# Patient Record
Sex: MALE | Race: WHITE | NOT HISPANIC OR LATINO | Employment: UNEMPLOYED | URBAN - METROPOLITAN AREA
[De-identification: names, ages, dates, MRNs, and addresses within clinical notes are randomized per-mention and may not be internally consistent; named-entity substitution may affect disease eponyms.]

---

## 2021-04-27 ENCOUNTER — LAB VISIT (OUTPATIENT)
Dept: LAB | Facility: HOSPITAL | Age: 31
End: 2021-04-27
Attending: NURSE PRACTITIONER
Payer: COMMERCIAL

## 2021-04-27 DIAGNOSIS — F20.9 SUBCHRONIC SCHIZOPHRENIA: Primary | ICD-10-CM

## 2021-04-27 PROCEDURE — 93005 ELECTROCARDIOGRAM TRACING: CPT | Mod: PO | Performed by: GENERAL PRACTICE

## 2022-03-03 ENCOUNTER — OCCUPATIONAL HEALTH (OUTPATIENT)
Dept: URGENT CARE | Facility: CLINIC | Age: 32
End: 2022-03-03

## 2022-03-03 PROCEDURE — 80305 DRUG TEST PRSMV DIR OPT OBS: CPT | Mod: S$GLB,,, | Performed by: EMERGENCY MEDICINE

## 2022-03-03 PROCEDURE — 80305 PR COLLECTION ONLY DRUG SCREEN: ICD-10-PCS | Mod: S$GLB,,, | Performed by: EMERGENCY MEDICINE

## 2022-08-01 ENCOUNTER — HOSPITAL ENCOUNTER (OUTPATIENT)
Dept: RADIOLOGY | Facility: HOSPITAL | Age: 32
Discharge: HOME | End: 2022-08-01
Attending: NURSE PRACTITIONER
Payer: COMMERCIAL

## 2022-08-01 DIAGNOSIS — M54.50 LUMBAGO: ICD-10-CM

## 2022-08-01 DIAGNOSIS — M54.50 LUMBAGO: Primary | ICD-10-CM

## 2022-08-01 PROCEDURE — 72110 X-RAY EXAM L-2 SPINE 4/>VWS: CPT | Mod: TC,PO

## 2023-03-28 ENCOUNTER — HOSPITAL ENCOUNTER (OUTPATIENT)
Dept: RADIOLOGY | Facility: HOSPITAL | Age: 33
Discharge: HOME | End: 2023-03-28
Payer: COMMERCIAL

## 2023-03-28 DIAGNOSIS — R74.8 ELEVATED LIVER ENZYMES: ICD-10-CM

## 2023-04-12 ENCOUNTER — HOSPITAL ENCOUNTER (OUTPATIENT)
Dept: RADIOLOGY | Facility: HOSPITAL | Age: 33
Discharge: HOME | End: 2023-04-12
Payer: COMMERCIAL

## 2023-04-12 PROCEDURE — 76705 ECHO EXAM OF ABDOMEN: CPT | Mod: TC,PO

## 2023-10-24 ENCOUNTER — NURSE TRIAGE (OUTPATIENT)
Dept: ADMINISTRATIVE | Facility: CLINIC | Age: 33
End: 2023-10-24

## 2023-10-24 NOTE — TELEPHONE ENCOUNTER
Have a red line across the back of his leg and has a slight indentation. From the bottom of his foot near the heel of his foot and he just noticed the area.  Reason for Disposition   Looks like a boil, infected sore, deep ulcer or other infected rash    Additional Information   Negative: Acne suspected   Negative: Athlete's Foot suspected   Negative: Chickenpox suspected (e.g., fever, widespread rash after known chickenpox exposure)   Negative: Hives suspected   Negative: Impetigo suspected   Negative: Insect bites suspected   Negative: Jock Itch suspected (i.e., itchy rash on inner thighs near genital area)   Negative: Poison ivy, oak, or sumac rash suspected (e.g., itchy rash after contact with poison ivy)   Negative: Rash of penis or scrotum   Negative: Ringworm suspected (i.e., round pink patch, sometimes looks like ring, usually 1/2 to 1 inch [12-25 mm],  in size, slowly increasing in size)   Negative: Sunburn suspected   Negative: Swimmer's Itch suspected   Negative: Wound infection suspected   Negative: Small spot, skin growth, or mole   Negative: [1] Widespread rash AND [2] began after starting a medicine OR within 3 days of stopping it   Negative: [1] Widespread rash AND [2] cause unknown   [1] Localized rash AND [2] cause unknown   Negative: [1] Sudden onset of rash (within last 2 hours) AND [2] difficulty breathing or swallowing   Negative: Sounds like a life-threatening emergency to the triager   Negative: [1] Localized purple or blood-colored spots or dots AND [2] not from injury or friction AND [3] fever   Negative: Patient sounds very sick or weak to the triager   Negative: [1] Red area or streak AND [2] fever   Negative: [1] Rash is painful to touch AND [2] fever   Negative: [1] Looks infected (spreading redness, pus) AND [2] large red area (> 2 in. or 5 cm)   Negative: [1] Looks infected (spreading redness, pus) AND [2] diabetes mellitus or weak immune system (e.g., HIV  positive, cancer chemo, splenectomy, organ transplant, chronic steroids)   Negative: [1] Localized purple or blood-colored spots or dots AND [2] not from injury or friction AND [3] no fever   Negative: [1] Looks infected (spreading redness, pus) AND [2] no fever    Protocols used: Rash - Guideline [id number]-H-DA, Rash or Redness - Auosbobzs-W-OZ

## 2024-03-19 DIAGNOSIS — E11.9 TYPE 2 DIABETES MELLITUS WITHOUT COMPLICATIONS: Primary | ICD-10-CM

## 2024-08-12 ENCOUNTER — HOSPITAL ENCOUNTER (EMERGENCY)
Facility: HOSPITAL | Age: 34
Discharge: LEFT BEFORE DISCHARGE | End: 2024-08-12
Payer: COMMERCIAL

## 2024-08-12 VITALS
RESPIRATION RATE: 18 BRPM | DIASTOLIC BLOOD PRESSURE: 60 MMHG | WEIGHT: 238 LBS | HEART RATE: 106 BPM | TEMPERATURE: 97 F | BODY MASS INDEX: 33.32 KG/M2 | OXYGEN SATURATION: 96 % | HEIGHT: 71 IN | SYSTOLIC BLOOD PRESSURE: 133 MMHG

## 2024-08-12 PROCEDURE — 99281 EMR DPT VST MAYX REQ PHY/QHP: CPT

## 2024-08-12 NOTE — FIRST PROVIDER EVALUATION
Emergency Department TeleTriage Encounter Note      CHIEF COMPLAINT    Chief Complaint   Patient presents with    Arm Problem     Possible  in grown hair        VITAL SIGNS   Initial Vitals [08/12/24 1136]   BP Pulse Resp Temp SpO2   133/60 106 18 97.3 °F (36.3 °C) 96 %      MAP       --            ALLERGIES    Review of patient's allergies indicates:  No Known Allergies    PROVIDER TRIAGE NOTE  Pulled an ingrown hair out and now has red dots to arm. Denies pain, swelling, or redness to the arm. No fever.     Patient states he will likely leave and call his PCP to be seen tomorrow since he has to be at work for noon. Informed patient that I cannot advise him on whether he should stay to be seen in the ER or call his PCP as my evaluation is limited.    Limited physical exam via telehealth: The patient is awake, alert, answering questions appropriately and is not in respiratory distress.  As the Teletriage provider, I performed an initial assessment and ordered appropriate labs and imaging studies, if any, to facilitate the patient's care once placed in the ED. Once a room is available, care and a full evaluation will be completed by an alternate ED provider.  Any additional orders and the final disposition will be determined by that provider.  All imaging and labs will not be followed-up by the Teletriage Team, including myself.          ORDERS  Labs Reviewed - No data to display    ED Orders (720h ago, onward)      None              Virtual Visit Note: The provider triage portion of this emergency department evaluation and documentation was performed via [employer], a HIPAA-compliant telemedicine application, in concert with a tele-presenter in the room. A face to face patient evaluation with one of my colleagues will occur once the patient is placed in an emergency department room.      DISCLAIMER: This note was prepared with [employer] voice recognition transcription software. Garbled syntax, mangled pronouns, and  other bizarre constructions may be attributed to that software system.

## 2024-08-14 ENCOUNTER — HOSPITAL ENCOUNTER (EMERGENCY)
Facility: HOSPITAL | Age: 34
Discharge: HOME | End: 2024-08-14
Attending: EMERGENCY MEDICINE
Payer: COMMERCIAL

## 2024-08-14 VITALS
OXYGEN SATURATION: 96 % | RESPIRATION RATE: 18 BRPM | DIASTOLIC BLOOD PRESSURE: 88 MMHG | BODY MASS INDEX: 39.02 KG/M2 | HEART RATE: 84 BPM | WEIGHT: 278.69 LBS | SYSTOLIC BLOOD PRESSURE: 163 MMHG | HEIGHT: 71 IN | TEMPERATURE: 98 F

## 2024-08-14 DIAGNOSIS — L73.1 INGROWN HAIR: Primary | ICD-10-CM

## 2024-08-14 PROCEDURE — 99283 EMERGENCY DEPT VISIT LOW MDM: CPT

## 2024-08-14 RX ORDER — MUPIROCIN 20 MG/G
OINTMENT TOPICAL 3 TIMES DAILY
Qty: 15 G | Refills: 0 | Status: SHIPPED | OUTPATIENT
Start: 2024-08-14

## 2024-08-14 RX ORDER — MUPIROCIN 20 MG/G
1 OINTMENT TOPICAL
Status: COMPLETED | OUTPATIENT
Start: 2024-08-14 | End: 2024-08-14

## 2024-08-14 RX ADMIN — MUPIROCIN 1 TUBE: 20 OINTMENT TOPICAL at 02:08

## 2024-08-14 NOTE — ED PROVIDER NOTES
"Encounter Date: 8/14/2024       History     Chief Complaint   Patient presents with    L forearm irritation     Pulled out an ingrown hair 2-3 days ago, Now feels as if "something is in there"     Patient presents emergency department with reported ingrown hair in his left forearm states he pulled the hair out but he was concerned that there maybe some bugs inside of it he denies any fever chills he has not used any IV drugs he denies any substance abuse he does have a history of paranoia but is not actively psychotic at this time        Review of patient's allergies indicates:  No Known Allergies  No past medical history on file.  Past Surgical History:   Procedure Laterality Date    APPENDECTOMY       No family history on file.  Social History     Tobacco Use    Smoking status: Some Days     Types: Cigarettes    Smokeless tobacco: Never   Substance Use Topics    Alcohol use: No     Comment: occasional    Drug use: No     Review of Systems   Skin:  Positive for wound.       Physical Exam     Initial Vitals [08/14/24 0143]   BP Pulse Resp Temp SpO2   (!) 163/88 84 18 97.7 °F (36.5 °C) 96 %      MAP       --         Physical Exam    Constitutional: He appears well-developed and well-nourished. No distress.   HENT:   Head: Normocephalic and atraumatic.     Neurological: He is alert and oriented to person, place, and time. He has normal strength. GCS score is 15. GCS eye subscore is 4. GCS verbal subscore is 5. GCS motor subscore is 6.   Skin: Skin is warm and dry. No erythema.   Left forearm with mild irritated area no significant papule no fluctuance no significant induration   Psychiatric: He has a normal mood and affect. His behavior is normal.         ED Course   Procedures  Labs Reviewed - No data to display       Imaging Results    None          Medications   mupirocin 2 % ointment 1 Tube (1 Tube Topical (Top) Given 8/14/24 0200)     Medical Decision Making  Patient has sequela of likely ingrown hair will treat " with Bactroban to prevent further infection return to ER for any worsened symptoms or new symptoms    Risk  Prescription drug management.                                      Clinical Impression:  Final diagnoses:  [L73.1] Ingrown hair (Primary)          ED Disposition Condition    Discharge Stable          ED Prescriptions       Medication Sig Dispense Start Date End Date Auth. Provider    mupirocin (BACTROBAN) 2 % ointment Apply topically 3 (three) times daily. 15 g 8/14/2024 -- Felix Nix MD          Follow-up Information       Follow up With Specialties Details Why Contact Info    Claire Funes, NP Internal Medicine In 1 day for re-examination of your symptoms [address] [place] [address] [address]  [place] [address]  [phone]               Felix Nix MD  08/14/24 0201

## 2024-08-15 ENCOUNTER — TELEPHONE (OUTPATIENT)
Dept: ADMINISTRATIVE | Facility: CLINIC | Age: 34
End: 2024-08-15

## 2024-08-15 ENCOUNTER — NURSE TRIAGE (OUTPATIENT)
Dept: ADMINISTRATIVE | Facility: CLINIC | Age: 34
End: 2024-08-15

## 2024-08-15 NOTE — TELEPHONE ENCOUNTER
Reason for Disposition   Symptoms interfere with work or school   Recent medical visit within 24 hours and symptoms SAME (unchanged) and caller has additional questions triager can answer    Additional Information   Negative: Shock suspected (e.g., cold/pale/clammy skin, too weak to stand, low BP, rapid pulse)   Negative: Difficult to awaken or acting confused (e.g., disoriented, slurred speech)   Negative: Sounds like a life-threatening emergency to the triager   Negative: Drinking very little and dehydration suspected (e.g., no urine > 12 hours, very dry mouth, very lightheaded)   Negative: Patient sounds very sick or weak to the triager   Negative: Patient attempted suicide   Negative: Patient is threatening suicide now   Negative: Violent behavior, or threatening to physically hurt or kill someone   Negative: Patient is very confused (disoriented, slurred speech) and no other adult (e.g., friend or family member) available   Negative: Difficult to awaken or acting very confused (disoriented, slurred speech) and new-onset   Negative: Sounds like a life-threatening emergency to the triager   Negative: Depression and unable to do any of normal activities (e.g., self care, school, work; in comparison to baseline).   Negative: Very strange or confused behavior   Negative: Patient sounds very sick or weak to the triager   Negative: Fever > 101 F  (38.3 C)   Negative: Sometimes has thoughts of suicide   Negative: Fever > 104 F  (40 C)   Negative: Caller has URGENT question and triager unable to answer question   Negative: SEVERE pain (e.g., excruciating, pain scale 8-10) and not improved after pain medications   Negative: Recent medical visit within 24 hours and condition/symptoms WORSE   Negative: Recent medical visit within 24 hours and NEW symptom that could be serious   Negative: Caller has URGENT question (includes prescribed medication questions) and triager unable to answer question   Negative: Patient wants to  be seen   Negative: Recent medical visit within 24 hours and new-onset of fever and doctor (or NP/PA) said to call if this occurred   Negative: Caller has NON-URGENT question (includes prescribed medication questions) and triager unable to answer    Protocols used: Recent Medical Visit for Illness Follow-up Call-A-OH, Depression-A-OH  Patient states he was concerned that a sore he was treated in the ED for may have had an insect in it. Patient denies any new or worsening symptoms from when he saw the ED Provider yesterday. Patient stated he had a history of psychosis, denies hallucinations or symptoms of psychosis. Patient states he feels better after speaking with this nurse about the site and the medication he received for it. Patient states he has lost his appetite. Patient screened for depression and instructed to see a healthcare provider within 3 days. No appointments are available near the patient. Patient agreed to an OD virtual visit. Patient refused assistance with schedulers for an appointment with his insurance. Assisted pt to start his Virtual visit. Pt states he will start it later. Provided with instructions on when to call OOC back. Pt verbalized understanding.

## 2024-08-15 NOTE — PROGRESS NOTES
"Symptom: Medication Question  Outcome: Schedule an urgent appointment (within 4 hours) or talk to a nurse or provider within 30 minutes.  Reason: New prescription question    The caller accepted this outcome. Pt stated, "I googled online what could possibly be wrong with me and there may be a bug in the wound and I am not sure what it is, but it said online there are specific creams for this and I am not sure what they prescribed will treat this," Pt would like to speak with a nurse. Report given to triage nurse and patient transferred.    "

## 2024-08-29 ENCOUNTER — HOSPITAL ENCOUNTER (EMERGENCY)
Facility: HOSPITAL | Age: 34
Discharge: HOME | End: 2024-08-29
Attending: EMERGENCY MEDICINE
Payer: COMMERCIAL

## 2024-08-29 VITALS
WEIGHT: 275.56 LBS | DIASTOLIC BLOOD PRESSURE: 93 MMHG | HEIGHT: 71 IN | BODY MASS INDEX: 38.58 KG/M2 | SYSTOLIC BLOOD PRESSURE: 138 MMHG | RESPIRATION RATE: 16 BRPM | HEART RATE: 91 BPM | OXYGEN SATURATION: 97 % | TEMPERATURE: 98 F

## 2024-08-29 DIAGNOSIS — Z51.89 VISIT FOR WOUND CHECK: Primary | ICD-10-CM

## 2024-08-29 PROCEDURE — 99281 EMR DPT VST MAYX REQ PHY/QHP: CPT

## 2024-08-29 NOTE — ED PROVIDER NOTES
Encounter Date: 8/29/2024       History     Chief Complaint   Patient presents with    arm lesion     Patient here for recheck of the spot on his left forearm seen after be removed than ingrown hair from his left forearm he was given prescription for Bactroban which he states he really did not use he reports that the area seemed to heal but there was a small dot next to it was concerned he might be getting another ingrown hair no other complaints        Review of patient's allergies indicates:  No Known Allergies  No past medical history on file.  Past Surgical History:   Procedure Laterality Date    APPENDECTOMY       No family history on file.  Social History     Tobacco Use    Smoking status: Some Days     Types: Cigarettes    Smokeless tobacco: Never   Substance Use Topics    Alcohol use: No     Comment: occasional    Drug use: No     Review of Systems   All other systems reviewed and are negative.      Physical Exam     Initial Vitals [08/29/24 0254]   BP Pulse Resp Temp SpO2   (!) 138/93 91 16 97.9 °F (36.6 °C) 97 %      MAP       --         Physical Exam    Constitutional: He appears well-developed and well-nourished. No distress.   HENT:   Head: Normocephalic and atraumatic.     Neurological: He is alert and oriented to person, place, and time. GCS score is 15. GCS eye subscore is 4. GCS verbal subscore is 5. GCS motor subscore is 6.   Skin: Skin is warm and dry. Capillary refill takes less than 2 seconds. No abscess noted. No erythema.   Area well-healed   Psychiatric: He has a normal mood and affect. His behavior is normal.         ED Course   Procedures  Labs Reviewed - No data to display       Imaging Results    None          Medications - No data to display  Medical Decision Making  Advised patient to continue Bactroban as previously prescribed return to ER for any worsened symptoms or new symptoms outpatient follow-up                                      Clinical Impression:  Final diagnoses:  [Z51.89]  Visit for wound check (Primary)          ED Disposition Condition    Discharge Stable          ED Prescriptions    None       Follow-up Information       Follow up With Specialties Details Why Contact Info    Claire Funes NP Internal Medicine Today for further evaluation and treatment [address] [place] [address] [place]  [place] [address]  [phone]               Felix Nix MD  08/29/24 4413

## 2025-06-30 ENCOUNTER — HOSPITAL ENCOUNTER (EMERGENCY)
Facility: HOSPITAL | Age: 35
Discharge: HOME | End: 2025-06-30
Attending: EMERGENCY MEDICINE
Payer: COMMERCIAL

## 2025-06-30 VITALS
DIASTOLIC BLOOD PRESSURE: 75 MMHG | HEIGHT: 71 IN | WEIGHT: 280 LBS | SYSTOLIC BLOOD PRESSURE: 124 MMHG | BODY MASS INDEX: 39.2 KG/M2 | HEART RATE: 74 BPM | OXYGEN SATURATION: 97 % | TEMPERATURE: 98 F | RESPIRATION RATE: 16 BRPM

## 2025-06-30 DIAGNOSIS — K64.5 THROMBOSED EXTERNAL HEMORRHOID: Primary | ICD-10-CM

## 2025-06-30 DIAGNOSIS — K64.9 BLEEDING HEMORRHOID: ICD-10-CM

## 2025-06-30 PROCEDURE — 99284 EMERGENCY DEPT VISIT MOD MDM: CPT

## 2025-06-30 RX ORDER — POLYETHYLENE GLYCOL 3350 17 G/17G
17 POWDER, FOR SOLUTION ORAL DAILY PRN
Qty: 14 EACH | Refills: 0 | Status: SHIPPED | OUTPATIENT
Start: 2025-06-30

## 2025-06-30 RX ORDER — HYDROCORTISONE 25 MG/G
CREAM TOPICAL 2 TIMES DAILY
Qty: 20 G | Refills: 0 | Status: SHIPPED | OUTPATIENT
Start: 2025-06-30

## 2025-06-30 NOTE — ED NOTES
Assumed care:  Jb Khoury is awake, alert and oriented x 3, skin warm and dry, in NAD.  Patient with history of hemorrhoids CO rectal bleeding that started last night and has gotten worse today.    Patient identifiers for Jb Khoury checked and correct.  LOC:  Jb Khoury is awake, alert, and aware of environment with an appropriate affect. He is oriented x 3 and speaking appropriately.  APPEARANCE:  He is resting comfortably and in no acute distress. He is clean and well groomed, patient's clothing is properly fastened.  SKIN:  The skin is warm and dry. He has normal skin turgor and moist mucus membranes. Skin is intact; no bruising or breakdown noted.  MUSCULOSKELETAL:  He is moving all extremities well, no obvious deformities noted. Pulses intact.   RESPIRATORY:  Airway is open and patent. Respirations are spontaneous and non-labored with normal effort and rate.  CARDIAC:  He has a normal rate and rhythm. No peripheral edema noted. Capillary refill < 3 seconds.  ABDOMEN:  No distention noted.  Soft and non-tender upon palpation.  Rectal bleeding  NEUROLOGICAL:  PERRL. Facial expression is symmetrical. Hand grasps are equal bilaterally. Normal sensation in all extremities when touched with finger.  Allergies reported:  Review of patient's allergies indicates:  No Known Allergies

## 2025-06-30 NOTE — DISCHARGE INSTRUCTIONS

## 2025-06-30 NOTE — ED PROVIDER NOTES
Encounter Date: 6/30/2025       History     Chief Complaint   Patient presents with    Hemorrhoids     Pt states he has hemorroids. States the blood seems to be more and dark red now. States there is some going into the toilet now, not just when he wipes. Mostly happens when he strains     34-year-old male with a past medical history of hyperlipidemia presents to ED for hemorrhoids.  Patient states he has had hemorrhoids for the last couple years but recently it started to bleed so he got concerned.  No medications prior to arrival.  Patient states he has been straining often with his bowel movements which he thinks this is the cause.  Patient states he did notice blood on the toilet paper and in the toilet after bowel movements.  Patient has had a recent normal colonoscopy.  Patient denies fever, sweats, chills, abdominal pain, nausea, vomiting, diarrhea, melena, hematochezia, urinary symptoms, dizziness, lightheadedness, and weakness.      Review of patient's allergies indicates:  No Known Allergies  Past Medical History:   Diagnosis Date    Mixed hyperlipidemia      Past Surgical History:   Procedure Laterality Date    APPENDECTOMY       No family history on file.  Social History[1]  Review of Systems   Constitutional:  Negative for chills, diaphoresis and fever.   Gastrointestinal:  Positive for rectal pain (bleeding hemorrhoid). Negative for abdominal pain, blood in stool, constipation, diarrhea, nausea and vomiting.   Genitourinary:  Negative for decreased urine volume, difficulty urinating, dysuria, frequency, hematuria and urgency.   Neurological:  Negative for dizziness, weakness and light-headedness.       Physical Exam     Initial Vitals [06/30/25 0851]   BP Pulse Resp Temp SpO2   120/72 76 16 97.9 °F (36.6 °C) 97 %      MAP       --         Physical Exam    Nursing note and vitals reviewed.  Constitutional: Vital signs are normal. He appears well-developed and well-nourished. He is not diaphoretic. He is  active. He does not appear ill. No distress.   HENT:   Head: Normocephalic and atraumatic.   Right Ear: External ear normal.   Left Ear: External ear normal.   Nose: Nose normal.   Eyes: Lids are normal. Pupils are equal, round, and reactive to light. Right eye exhibits no discharge. Left eye exhibits no discharge. No scleral icterus.   Neck: Neck supple.   Normal range of motion.  Pulmonary/Chest: Effort normal. No respiratory distress.   Abdominal: Abdomen is soft. He exhibits no distension. There is no abdominal tenderness.   Genitourinary:    Prostate normal.   Rectum:      External hemorrhoid (Thrombosed bleeding) present.      No rectal mass, anal fissure, tenderness or abnormal anal tone.      Genitourinary Comments: Nurse chaperone     Musculoskeletal:         General: Normal range of motion.      Cervical back: Normal range of motion and neck supple.     Neurological: He is alert and oriented to person, place, and time. He has normal strength. No sensory deficit. GCS eye subscore is 4. GCS verbal subscore is 5. GCS motor subscore is 6.   Skin: Skin is dry. Capillary refill takes less than 2 seconds.         ED Course   Procedures  Labs Reviewed - No data to display         Imaging Results    None          Medications - No data to display  Medical Decision Making  34-year-old male with a past medical history of hyperlipidemia presents to ED for hemorrhoids.    Patient's chart and medical history reviewed.    Ddx:  External hemorrhoid  Internal hemorrhoid  Thrombosed hemorrhoid  Anal fissure  Anal abscess  GI bleed    Patient's vitals reviewed.  Afebrile, no respiratory distress, and nontoxic-appearing in the ED. Patient had an external thrombosed bleeding hemorrhoid on rectal exam.  Nurse chaperone.  Discussed this case with .  Patient will be sent home referral to Colorectal surgery.  Patient will also be sent home with MiraLax and Anusol cream for symptomatic control of his external bleeding  hemorrhoid.  Considered but unlikely GI bleed with no melena, no hematochezia, no abdominal pain, overall well-appearing, and stable vitals. Patient agrees with this plan. Discussed with him strict return precautions, he verbalized understanding. Patient is stable for discharge.     Amount and/or Complexity of Data Reviewed  External Data Reviewed: labs, radiology and notes.    Risk  OTC drugs.  Prescription drug management.              Attending Attestation:     Physician Attestation Statement for NP/PA:       Other NP/PA Attestation Additions:    History of Present Illness: 34-year-old male presents for hemorrhoids.    Medical Decision Making: Initial differential diagnosis included but not limited to bleeding hemorrhoids, thrombosed hemorrhoids, and rectal fissure.  I am in agreement with the physician assistant's  assessment, treatment, and plan of care.                                    Clinical Impression:  Final diagnoses:  [K64.5] Thrombosed external hemorrhoid (Primary)  [K64.9] Bleeding hemorrhoid          ED Disposition Condition    Discharge Stable          ED Prescriptions       Medication Sig Dispense Start Date End Date Auth. Provider    hydrocortisone 2.5 % cream Apply topically 2 (two) times daily. 20 g 6/30/2025 -- Holdsworth, Alayna, PA-C    polyethylene glycol (GLYCOLAX) 17 gram PwPk Take 17 g by mouth daily as needed for Constipation. 14 each 6/30/2025 -- Holdsworth, Alayna, PA-C          Follow-up Information       Follow up With Specialties Details Why Contact Info    Eduardo Hinds MD General Surgery, Colon and Rectal Surgery, Surgery Call   [address] [address]  [phone]                   Holdsworth, Alayna, PA-C  06/30/25 0953         [1]   Social History  Tobacco Use    Smoking status: Some Days     Types: Cigarettes    Smokeless tobacco: Never   Substance Use Topics    Alcohol use: No     Comment: occasional    Drug use: No        Rob Valdovinos,  MD  06/30/25 7050